# Patient Record
Sex: MALE | Race: BLACK OR AFRICAN AMERICAN | NOT HISPANIC OR LATINO | Employment: UNEMPLOYED | URBAN - METROPOLITAN AREA
[De-identification: names, ages, dates, MRNs, and addresses within clinical notes are randomized per-mention and may not be internally consistent; named-entity substitution may affect disease eponyms.]

---

## 2017-06-03 ENCOUNTER — HOSPITAL ENCOUNTER (EMERGENCY)
Facility: OTHER | Age: 27
Discharge: LEFT BEFORE DISCHARGE | End: 2017-06-03
Attending: EMERGENCY MEDICINE

## 2017-06-03 VITALS
OXYGEN SATURATION: 100 % | SYSTOLIC BLOOD PRESSURE: 130 MMHG | RESPIRATION RATE: 18 BRPM | TEMPERATURE: 98 F | DIASTOLIC BLOOD PRESSURE: 80 MMHG | HEART RATE: 57 BPM

## 2017-06-03 DIAGNOSIS — K52.9 COLITIS, ACUTE: Primary | ICD-10-CM

## 2017-06-03 DIAGNOSIS — R10.30 LOWER ABDOMINAL PAIN: ICD-10-CM

## 2017-06-03 LAB
ALBUMIN SERPL-MCNC: 4.1 G/DL (ref 3.3–5.5)
ALBUMIN SERPL-MCNC: 4.4 G/DL (ref 3.3–5.5)
ALP SERPL-CCNC: 56 U/L (ref 42–141)
ALP SERPL-CCNC: 60 U/L (ref 42–141)
BILIRUB SERPL-MCNC: 1.1 MG/DL (ref 0.2–1.6)
BILIRUB SERPL-MCNC: 1.1 MG/DL (ref 0.2–1.6)
BILIRUBIN, POC UA: ABNORMAL
BLOOD, POC UA: ABNORMAL
BUN SERPL-MCNC: 12 MG/DL (ref 7–22)
CALCIUM SERPL-MCNC: 9.4 MG/DL (ref 8–10.3)
CHLORIDE SERPL-SCNC: 102 MMOL/L (ref 98–108)
CLARITY, POC UA: CLEAR
COLOR, POC UA: ABNORMAL
CREAT SERPL-MCNC: 0.8 MG/DL (ref 0.6–1.2)
GLUCOSE SERPL-MCNC: 107 MG/DL (ref 73–118)
GLUCOSE, POC UA: NEGATIVE
KETONES, POC UA: 80 MG/DL
LEUKOCYTE EST, POC UA: NEGATIVE
NITRITE, POC UA: NEGATIVE
PH UR STRIP: 6 [PH]
POC ALT (SGPT): 13 U/L (ref 10–47)
POC ALT (SGPT): 14 U/L (ref 10–47)
POC AMYLASE: 49 U/L (ref 14–97)
POC AST (SGOT): 18 U/L (ref 11–38)
POC AST (SGOT): 20 U/L (ref 11–38)
POC GGT: 16 U/L (ref 5–65)
POC TCO2: 28 MMOL/L (ref 18–33)
POTASSIUM BLD-SCNC: 3.4 MMOL/L (ref 3.6–5.1)
PROTEIN, POC UA: 100 MG/DL
PROTEIN, POC: 7.5 G/DL (ref 6.4–8.1)
PROTEIN, POC: 7.5 G/DL (ref 6.4–8.1)
SODIUM BLD-SCNC: 140 MMOL/L (ref 128–145)
SPECIFIC GRAVITY, POC UA: >=1.03
UROBILINOGEN, POC UA: 0.2 E.U./DL

## 2017-06-03 PROCEDURE — 96372 THER/PROPH/DIAG INJ SC/IM: CPT

## 2017-06-03 PROCEDURE — 96374 THER/PROPH/DIAG INJ IV PUSH: CPT

## 2017-06-03 PROCEDURE — 96375 TX/PRO/DX INJ NEW DRUG ADDON: CPT

## 2017-06-03 PROCEDURE — 25500020 PHARM REV CODE 255: Performed by: EMERGENCY MEDICINE

## 2017-06-03 PROCEDURE — 63600175 PHARM REV CODE 636 W HCPCS: Performed by: EMERGENCY MEDICINE

## 2017-06-03 PROCEDURE — 25000003 PHARM REV CODE 250: Performed by: EMERGENCY MEDICINE

## 2017-06-03 PROCEDURE — 96361 HYDRATE IV INFUSION ADD-ON: CPT

## 2017-06-03 PROCEDURE — 99284 EMERGENCY DEPT VISIT MOD MDM: CPT | Mod: 25

## 2017-06-03 RX ORDER — DICYCLOMINE HYDROCHLORIDE 20 MG/1
20 TABLET ORAL EVERY 6 HOURS
Qty: 30 TABLET | Refills: 0 | Status: SHIPPED | OUTPATIENT
Start: 2017-06-03

## 2017-06-03 RX ORDER — CIPROFLOXACIN 500 MG/1
500 TABLET ORAL
Status: COMPLETED | OUTPATIENT
Start: 2017-06-03 | End: 2017-06-03

## 2017-06-03 RX ORDER — ONDANSETRON 2 MG/ML
4 INJECTION INTRAMUSCULAR; INTRAVENOUS
Status: COMPLETED | OUTPATIENT
Start: 2017-06-03 | End: 2017-06-03

## 2017-06-03 RX ORDER — ONDANSETRON 4 MG/1
4 TABLET, FILM COATED ORAL EVERY 6 HOURS
Qty: 14 TABLET | Refills: 0 | Status: SHIPPED | OUTPATIENT
Start: 2017-06-03

## 2017-06-03 RX ORDER — CIPROFLOXACIN 250 MG/1
250 TABLET, FILM COATED ORAL 2 TIMES DAILY
Qty: 14 TABLET | Refills: 0 | Status: SHIPPED | OUTPATIENT
Start: 2017-06-03 | End: 2017-06-10

## 2017-06-03 RX ORDER — METRONIDAZOLE 500 MG/1
500 TABLET ORAL EVERY 12 HOURS
Qty: 14 TABLET | Refills: 0 | Status: SHIPPED | OUTPATIENT
Start: 2017-06-03 | End: 2017-06-10

## 2017-06-03 RX ORDER — DICYCLOMINE HYDROCHLORIDE 10 MG/ML
20 INJECTION INTRAMUSCULAR
Status: COMPLETED | OUTPATIENT
Start: 2017-06-03 | End: 2017-06-03

## 2017-06-03 RX ORDER — METRONIDAZOLE 500 MG/1
500 TABLET ORAL
Status: COMPLETED | OUTPATIENT
Start: 2017-06-03 | End: 2017-06-03

## 2017-06-03 RX ORDER — KETOROLAC TROMETHAMINE 30 MG/ML
30 INJECTION, SOLUTION INTRAMUSCULAR; INTRAVENOUS
Status: COMPLETED | OUTPATIENT
Start: 2017-06-03 | End: 2017-06-03

## 2017-06-03 RX ADMIN — SODIUM CHLORIDE 1000 ML: 0.9 INJECTION, SOLUTION INTRAVENOUS at 10:06

## 2017-06-03 RX ADMIN — CIPROFLOXACIN 500 MG: 500 TABLET, FILM COATED ORAL at 12:06

## 2017-06-03 RX ADMIN — DICYCLOMINE HYDROCHLORIDE 20 MG: 20 INJECTION, SOLUTION INTRAMUSCULAR at 10:06

## 2017-06-03 RX ADMIN — KETOROLAC TROMETHAMINE 30 MG: 30 INJECTION, SOLUTION INTRAMUSCULAR at 10:06

## 2017-06-03 RX ADMIN — METRONIDAZOLE 500 MG: 500 TABLET ORAL at 12:06

## 2017-06-03 RX ADMIN — ONDANSETRON 4 MG: 2 INJECTION INTRAMUSCULAR; INTRAVENOUS at 10:06

## 2017-06-03 RX ADMIN — IOHEXOL 100 ML: 350 INJECTION, SOLUTION INTRAVENOUS at 10:06

## 2017-06-03 NOTE — ED NOTES
26 y/o male presents to the ER with the cc of abd pain that has been intermittent since 3 pm yesterday with nausea and vomiting. Patient describes the pain as sharp and is primarily located around the umbilical area. Patient  Reports he took Zantatc with no relief on last night. Reports when he moves his stomach hurts worse.  Intermittent chills and scant diarrhea noted. Patient reports feeling bloated when trying to lie down. Unable to get comfortable. patient at present + nausea no vomiting. Reports unable to tolerate fluids

## 2017-06-03 NOTE — ED NOTES
Comfort measures done. Turned lights off provided a pillow and warm blanket. Updated patient on plan of care to get a CT after labs has resulted

## 2017-06-03 NOTE — ED NOTES
Patient notified to wait for his discharge paperwork. Patient and friend verbalize understanding of his instructions. Patient given a power aid as a PO challenge

## 2017-06-03 NOTE — ED PROVIDER NOTES
"Encounter Date: 6/3/2017       History     Chief Complaint   Patient presents with    Abdominal Pain     + abd pain with nausea and vomiting with diarrhea describes as a cramping feeling     Review of patient's allergies indicates:  No Known Allergies  27-year-old without significant past medical history complains of "pain" to his entire abdomen since 2 PM yesterday.  The pain is been constant.  He took Zantac without relief.  Patient rates his pain as 10 out of 10.  Pain worsens when he moves.  Patient had multiple episodes of vomiting and diarrhea as well.  He reports normal urine output.  No fever.  No history of abdominal surgeries.  Patient may have eaten bad taco.  He denies sick contacts.       The history is provided by the patient.     No past medical history on file.  No past surgical history on file.  No family history on file.  Social History   Substance Use Topics    Smoking status: Not on file    Smokeless tobacco: Not on file    Alcohol use Not on file     Review of Systems   Constitutional: Negative for fever.   HENT: Negative for sore throat.    Eyes: Negative for pain.   Respiratory: Negative for shortness of breath.    Cardiovascular: Negative for chest pain.   Gastrointestinal: Positive for abdominal pain, diarrhea, nausea and vomiting.   Genitourinary: Negative for dysuria.   Musculoskeletal: Negative for back pain.   Skin: Negative for rash.   Neurological: Negative for headaches.       Physical Exam     Initial Vitals   BP Pulse Resp Temp SpO2   -- -- -- -- --     Physical Exam    Nursing note and vitals reviewed.  Constitutional: He appears well-developed and well-nourished. He appears distressed.   HENT:   Head: Normocephalic and atraumatic.   Eyes: EOM are normal. Pupils are equal, round, and reactive to light.   Neck: Normal range of motion. Neck supple.   Cardiovascular: Normal rate and regular rhythm.   Pulmonary/Chest: Breath sounds normal.   Abdominal: Soft. There is no rebound and " no guarding.       Musculoskeletal: Normal range of motion.   Neurological: He is alert and oriented to person, place, and time. No cranial nerve deficit.   Skin: Skin is warm and dry.   Psychiatric: He has a normal mood and affect.         ED Course   Procedures  Labs Reviewed   POCT URINALYSIS W/O SCOPE   POCT CBC   POCT CMP   POCT AMYLASE             Medical Decision Making:   Initial Assessment:   27-year-old who presents with multiple episodes of vomiting and diarrhea associated with diffuse abdominal pain.  On exam patient appears uncomfortable.  He does have tenderness to his right lower quadrant.  Clinical Tests:   Lab Tests: Ordered  Radiological Study: Ordered  ED Management:  Patient will be given Zofran, Toradol, Bentyl.  CT of abdomen and pelvis will be done.  CT does show inflammation in the colon.  No evidence of appendicitis.  Patient's labs showed no significant abnormalities.  Patient felt much better after the medications provided and was able to tolerate liquids.  He was given Cipro and Flagyl in the emergency department by mouth.  However, patient eloped before receiving his prescriptions.  The nurse called multiple times to contact the patient.                   ED Course     Clinical Impression:   The primary encounter diagnosis was Colitis, acute. A diagnosis of Lower abdominal pain was also pertinent to this visit.          Loni Pat MD  06/04/17 1021